# Patient Record
Sex: FEMALE | Race: WHITE | NOT HISPANIC OR LATINO | Employment: OTHER | URBAN - METROPOLITAN AREA
[De-identification: names, ages, dates, MRNs, and addresses within clinical notes are randomized per-mention and may not be internally consistent; named-entity substitution may affect disease eponyms.]

---

## 2017-10-05 ENCOUNTER — HOSPITAL ENCOUNTER (OUTPATIENT)
Facility: MEDICAL CENTER | Age: 67
End: 2017-10-05
Attending: PHYSICIAN ASSISTANT
Payer: MEDICARE

## 2017-10-05 PROCEDURE — 87086 URINE CULTURE/COLONY COUNT: CPT

## 2017-10-07 LAB
BACTERIA UR CULT: NORMAL
SIGNIFICANT IND 70042: NORMAL
SITE SITE: NORMAL
SOURCE SOURCE: NORMAL

## 2019-08-25 PROBLEM — M79.671 RIGHT FOOT PAIN: Status: ACTIVE | Noted: 2019-08-25

## 2019-08-25 PROBLEM — E03.9 HYPOTHYROIDISM: Status: ACTIVE | Noted: 2019-08-25

## 2019-08-25 PROBLEM — I10 ESSENTIAL HYPERTENSION: Status: ACTIVE | Noted: 2019-08-25

## 2019-08-25 PROBLEM — Z86.73 HISTORY OF STROKE: Status: ACTIVE | Noted: 2019-08-25

## 2019-08-25 PROBLEM — Z98.890 HISTORY OF LUMBAR SURGERY: Status: ACTIVE | Noted: 2019-08-25

## 2019-08-25 PROBLEM — M48.061 SPINAL STENOSIS OF LUMBAR REGION WITHOUT NEUROGENIC CLAUDICATION: Status: ACTIVE | Noted: 2019-08-25

## 2023-10-10 PROBLEM — M75.41 IMPINGEMENT SYNDROME OF RIGHT SHOULDER: Status: ACTIVE | Noted: 2023-10-10

## 2023-10-10 PROBLEM — S46.011A STRAIN OF TENDON OF RIGHT ROTATOR CUFF: Status: ACTIVE | Noted: 2023-10-10

## 2023-12-05 ENCOUNTER — APPOINTMENT (OUTPATIENT)
Dept: ADMISSIONS | Facility: MEDICAL CENTER | Age: 73
End: 2023-12-05
Attending: ORTHOPAEDIC SURGERY
Payer: MEDICARE

## 2023-12-12 ENCOUNTER — PRE-ADMISSION TESTING (OUTPATIENT)
Dept: ADMISSIONS | Facility: MEDICAL CENTER | Age: 73
End: 2023-12-12
Attending: ORTHOPAEDIC SURGERY
Payer: MEDICARE

## 2023-12-12 VITALS — BODY MASS INDEX: 24.03 KG/M2 | HEIGHT: 64 IN

## 2023-12-12 RX ORDER — ROSUVASTATIN CALCIUM 5 MG/1
5 TABLET, COATED ORAL DAILY
COMMUNITY

## 2023-12-12 RX ORDER — ACETAMINOPHEN 500 MG
500-1000 TABLET ORAL EVERY 6 HOURS PRN
COMMUNITY

## 2023-12-12 RX ORDER — EVENING PRIMROSE OIL 500 MG
CAPSULE ORAL DAILY
COMMUNITY

## 2023-12-12 RX ORDER — MULTIVIT WITH MINERALS/LUTEIN
2000 TABLET ORAL DAILY
COMMUNITY

## 2023-12-12 NOTE — PREPROCEDURE INSTRUCTIONS
PreAdmit Telephone Appointment: Reviewed the Preparing for your procedure handout with patient over the phone. Patient instructed per pharmacy guidelines regarding taking, holding or contacting provider for instructions on regularly prescribed medications before surgery. Instructed to take the following medications the day of surgery with a sip of water per pharmacy medication guidelines:    Synthroid, tylenol  Confirmed with patient where to check in morning of surgery. Handouts/Brochure/Video emailed to patient.

## 2023-12-28 ENCOUNTER — PRE-ADMISSION TESTING (OUTPATIENT)
Dept: ADMISSIONS | Facility: MEDICAL CENTER | Age: 73
End: 2023-12-28
Attending: ORTHOPAEDIC SURGERY
Payer: MEDICARE

## 2023-12-28 DIAGNOSIS — Z01.810 PRE-OPERATIVE CARDIOVASCULAR EXAMINATION: ICD-10-CM

## 2023-12-28 DIAGNOSIS — Z01.812 PRE-OPERATIVE LABORATORY EXAMINATION: ICD-10-CM

## 2023-12-28 LAB
ANION GAP SERPL CALC-SCNC: 13 MMOL/L (ref 7–16)
BUN SERPL-MCNC: 26 MG/DL (ref 8–22)
CALCIUM SERPL-MCNC: 9.4 MG/DL (ref 8.4–10.2)
CHLORIDE SERPL-SCNC: 104 MMOL/L (ref 96–112)
CO2 SERPL-SCNC: 25 MMOL/L (ref 20–33)
CREAT SERPL-MCNC: 0.72 MG/DL (ref 0.5–1.4)
EKG IMPRESSION: NORMAL
GFR SERPLBLD CREATININE-BSD FMLA CKD-EPI: 88 ML/MIN/1.73 M 2
GLUCOSE SERPL-MCNC: 92 MG/DL (ref 65–99)
POTASSIUM SERPL-SCNC: 3.3 MMOL/L (ref 3.6–5.5)
SODIUM SERPL-SCNC: 142 MMOL/L (ref 135–145)

## 2023-12-28 PROCEDURE — 93005 ELECTROCARDIOGRAM TRACING: CPT

## 2023-12-28 PROCEDURE — 36415 COLL VENOUS BLD VENIPUNCTURE: CPT

## 2023-12-28 PROCEDURE — 93010 ELECTROCARDIOGRAM REPORT: CPT | Performed by: STUDENT IN AN ORGANIZED HEALTH CARE EDUCATION/TRAINING PROGRAM

## 2023-12-28 PROCEDURE — 80048 BASIC METABOLIC PNL TOTAL CA: CPT

## 2023-12-28 RX ORDER — PANTOPRAZOLE SODIUM 40 MG/1
40 TABLET, DELAYED RELEASE ORAL DAILY
COMMUNITY
Start: 2023-12-15

## 2024-01-09 ENCOUNTER — ANESTHESIA EVENT (OUTPATIENT)
Dept: SURGERY | Facility: MEDICAL CENTER | Age: 74
End: 2024-01-09
Payer: MEDICARE

## 2024-01-10 ENCOUNTER — HOSPITAL ENCOUNTER (OUTPATIENT)
Facility: MEDICAL CENTER | Age: 74
End: 2024-01-10
Attending: ORTHOPAEDIC SURGERY | Admitting: ORTHOPAEDIC SURGERY
Payer: MEDICARE

## 2024-01-10 ENCOUNTER — PHARMACY VISIT (OUTPATIENT)
Dept: PHARMACY | Facility: MEDICAL CENTER | Age: 74
End: 2024-01-10
Payer: COMMERCIAL

## 2024-01-10 ENCOUNTER — ANESTHESIA (OUTPATIENT)
Dept: SURGERY | Facility: MEDICAL CENTER | Age: 74
End: 2024-01-10
Payer: MEDICARE

## 2024-01-10 VITALS
RESPIRATION RATE: 18 BRPM | HEIGHT: 64 IN | HEART RATE: 92 BPM | BODY MASS INDEX: 25.2 KG/M2 | SYSTOLIC BLOOD PRESSURE: 159 MMHG | OXYGEN SATURATION: 95 % | DIASTOLIC BLOOD PRESSURE: 94 MMHG | TEMPERATURE: 97.9 F | WEIGHT: 147.6 LBS

## 2024-01-10 DIAGNOSIS — M75.41 IMPINGEMENT SYNDROME OF RIGHT SHOULDER: ICD-10-CM

## 2024-01-10 DIAGNOSIS — S46.011A STRAIN OF TENDON OF RIGHT ROTATOR CUFF, INITIAL ENCOUNTER: ICD-10-CM

## 2024-01-10 PROCEDURE — 29823 SHO ARTHRS SRG XTNSV DBRDMT: CPT | Mod: RT | Performed by: ORTHOPAEDIC SURGERY

## 2024-01-10 PROCEDURE — 160029 HCHG SURGERY MINUTES - 1ST 30 MINS LEVEL 4: Performed by: ORTHOPAEDIC SURGERY

## 2024-01-10 PROCEDURE — 160046 HCHG PACU - 1ST 60 MINS PHASE II: Performed by: ORTHOPAEDIC SURGERY

## 2024-01-10 PROCEDURE — 160035 HCHG PACU - 1ST 60 MINS PHASE I: Performed by: ORTHOPAEDIC SURGERY

## 2024-01-10 PROCEDURE — C1713 ANCHOR/SCREW BN/BN,TIS/BN: HCPCS | Performed by: ORTHOPAEDIC SURGERY

## 2024-01-10 PROCEDURE — 160036 HCHG PACU - EA ADDL 30 MINS PHASE I: Performed by: ORTHOPAEDIC SURGERY

## 2024-01-10 PROCEDURE — 700111 HCHG RX REV CODE 636 W/ 250 OVERRIDE (IP): Performed by: ANESTHESIOLOGY

## 2024-01-10 PROCEDURE — 160048 HCHG OR STATISTICAL LEVEL 1-5: Performed by: ORTHOPAEDIC SURGERY

## 2024-01-10 PROCEDURE — 160002 HCHG RECOVERY MINUTES (STAT): Performed by: ORTHOPAEDIC SURGERY

## 2024-01-10 PROCEDURE — RXMED WILLOW AMBULATORY MEDICATION CHARGE: Performed by: ORTHOPAEDIC SURGERY

## 2024-01-10 PROCEDURE — 29827 SHO ARTHRS SRG RT8TR CUF RPR: CPT | Mod: RT | Performed by: ORTHOPAEDIC SURGERY

## 2024-01-10 PROCEDURE — 700105 HCHG RX REV CODE 258: Performed by: ORTHOPAEDIC SURGERY

## 2024-01-10 PROCEDURE — 29827 SHO ARTHRS SRG RT8TR CUF RPR: CPT | Mod: ASROC,RT | Performed by: PHYSICIAN ASSISTANT

## 2024-01-10 PROCEDURE — 160025 RECOVERY II MINUTES (STATS): Performed by: ORTHOPAEDIC SURGERY

## 2024-01-10 PROCEDURE — 700111 HCHG RX REV CODE 636 W/ 250 OVERRIDE (IP): Mod: JZ | Performed by: ORTHOPAEDIC SURGERY

## 2024-01-10 PROCEDURE — 700102 HCHG RX REV CODE 250 W/ 637 OVERRIDE(OP): Performed by: ANESTHESIOLOGY

## 2024-01-10 PROCEDURE — 700101 HCHG RX REV CODE 250: Performed by: ANESTHESIOLOGY

## 2024-01-10 PROCEDURE — A9270 NON-COVERED ITEM OR SERVICE: HCPCS | Performed by: ANESTHESIOLOGY

## 2024-01-10 PROCEDURE — 29826 SHO ARTHRS SRG DECOMPRESSION: CPT | Mod: ASROC,RT | Performed by: PHYSICIAN ASSISTANT

## 2024-01-10 PROCEDURE — 160041 HCHG SURGERY MINUTES - EA ADDL 1 MIN LEVEL 4: Performed by: ORTHOPAEDIC SURGERY

## 2024-01-10 PROCEDURE — 29826 SHO ARTHRS SRG DECOMPRESSION: CPT | Mod: RT | Performed by: ORTHOPAEDIC SURGERY

## 2024-01-10 PROCEDURE — 160009 HCHG ANES TIME/MIN: Performed by: ORTHOPAEDIC SURGERY

## 2024-01-10 PROCEDURE — 160047 HCHG PACU  - EA ADDL 30 MINS PHASE II: Performed by: ORTHOPAEDIC SURGERY

## 2024-01-10 PROCEDURE — 700101 HCHG RX REV CODE 250: Performed by: ORTHOPAEDIC SURGERY

## 2024-01-10 PROCEDURE — 64415 NJX AA&/STRD BRCH PLXS IMG: CPT | Performed by: ORTHOPAEDIC SURGERY

## 2024-01-10 PROCEDURE — 29823 SHO ARTHRS SRG XTNSV DBRDMT: CPT | Mod: ASROC,RT | Performed by: PHYSICIAN ASSISTANT

## 2024-01-10 DEVICE — ANCHOR SUTURE 4.75MM ALPHAVENT PEEK 3 STRANDS WITH #2 XBRAID (1EA): Type: IMPLANTABLE DEVICE | Site: SHOULDER | Status: FUNCTIONAL

## 2024-01-10 RX ORDER — SODIUM CHLORIDE, SODIUM LACTATE, POTASSIUM CHLORIDE, CALCIUM CHLORIDE 600; 310; 30; 20 MG/100ML; MG/100ML; MG/100ML; MG/100ML
INJECTION, SOLUTION INTRAVENOUS CONTINUOUS
Status: ACTIVE | OUTPATIENT
Start: 2024-01-10 | End: 2024-01-10

## 2024-01-10 RX ORDER — ACETAMINOPHEN 500 MG
1000 TABLET ORAL ONCE
Status: COMPLETED | OUTPATIENT
Start: 2024-01-10 | End: 2024-01-10

## 2024-01-10 RX ORDER — PHENYLEPHRINE HCL IN 0.9% NACL 0.5 MG/5ML
SYRINGE (ML) INTRAVENOUS PRN
Status: DISCONTINUED | OUTPATIENT
Start: 2024-01-10 | End: 2024-01-10 | Stop reason: SURG

## 2024-01-10 RX ORDER — SODIUM CHLORIDE, SODIUM LACTATE, POTASSIUM CHLORIDE, CALCIUM CHLORIDE 600; 310; 30; 20 MG/100ML; MG/100ML; MG/100ML; MG/100ML
INJECTION, SOLUTION INTRAVENOUS CONTINUOUS
Status: DISCONTINUED | OUTPATIENT
Start: 2024-01-10 | End: 2024-01-10 | Stop reason: HOSPADM

## 2024-01-10 RX ORDER — BUPIVACAINE HYDROCHLORIDE AND EPINEPHRINE 2.5; 5 MG/ML; UG/ML
INJECTION, SOLUTION EPIDURAL; INFILTRATION; INTRACAUDAL; PERINEURAL
Status: DISCONTINUED | OUTPATIENT
Start: 2024-01-10 | End: 2024-01-10 | Stop reason: HOSPADM

## 2024-01-10 RX ORDER — ONDANSETRON 2 MG/ML
4 INJECTION INTRAMUSCULAR; INTRAVENOUS
Status: COMPLETED | OUTPATIENT
Start: 2024-01-10 | End: 2024-01-10

## 2024-01-10 RX ORDER — ONDANSETRON 4 MG/1
4 TABLET, ORALLY DISINTEGRATING ORAL EVERY 6 HOURS PRN
Qty: 20 TABLET | Refills: 0 | Status: SHIPPED | OUTPATIENT
Start: 2024-01-10

## 2024-01-10 RX ORDER — DIPHENHYDRAMINE HYDROCHLORIDE 50 MG/ML
12.5 INJECTION INTRAMUSCULAR; INTRAVENOUS
Status: COMPLETED | OUTPATIENT
Start: 2024-01-10 | End: 2024-01-10

## 2024-01-10 RX ORDER — CEFAZOLIN SODIUM 1 G/3ML
2 INJECTION, POWDER, FOR SOLUTION INTRAMUSCULAR; INTRAVENOUS ONCE
Status: COMPLETED | OUTPATIENT
Start: 2024-01-10 | End: 2024-01-10

## 2024-01-10 RX ORDER — DEXAMETHASONE SODIUM PHOSPHATE 4 MG/ML
INJECTION, SOLUTION INTRA-ARTICULAR; INTRALESIONAL; INTRAMUSCULAR; INTRAVENOUS; SOFT TISSUE PRN
Status: DISCONTINUED | OUTPATIENT
Start: 2024-01-10 | End: 2024-01-10 | Stop reason: SURG

## 2024-01-10 RX ORDER — MECLIZINE HYDROCHLORIDE 25 MG/1
50 TABLET ORAL ONCE
Status: COMPLETED | OUTPATIENT
Start: 2024-01-10 | End: 2024-01-10

## 2024-01-10 RX ORDER — ROPIVACAINE HYDROCHLORIDE 5 MG/ML
INJECTION, SOLUTION EPIDURAL; INFILTRATION; PERINEURAL PRN
Status: DISCONTINUED | OUTPATIENT
Start: 2024-01-10 | End: 2024-01-10 | Stop reason: SURG

## 2024-01-10 RX ORDER — ONDANSETRON 2 MG/ML
INJECTION INTRAMUSCULAR; INTRAVENOUS PRN
Status: DISCONTINUED | OUTPATIENT
Start: 2024-01-10 | End: 2024-01-10 | Stop reason: SURG

## 2024-01-10 RX ORDER — AMOXICILLIN 250 MG
1 CAPSULE ORAL ONCE
Status: COMPLETED | OUTPATIENT
Start: 2024-01-10 | End: 2024-01-10

## 2024-01-10 RX ORDER — CELECOXIB 200 MG/1
200 CAPSULE ORAL 2 TIMES DAILY PRN
Qty: 30 CAPSULE | Refills: 1 | Status: SHIPPED | OUTPATIENT
Start: 2024-01-10

## 2024-01-10 RX ORDER — ROCURONIUM BROMIDE 10 MG/ML
INJECTION, SOLUTION INTRAVENOUS PRN
Status: DISCONTINUED | OUTPATIENT
Start: 2024-01-10 | End: 2024-01-10 | Stop reason: SURG

## 2024-01-10 RX ORDER — LIDOCAINE HYDROCHLORIDE 20 MG/ML
INJECTION, SOLUTION EPIDURAL; INFILTRATION; INTRACAUDAL; PERINEURAL PRN
Status: DISCONTINUED | OUTPATIENT
Start: 2024-01-10 | End: 2024-01-10 | Stop reason: SURG

## 2024-01-10 RX ORDER — HYDROCODONE BITARTRATE AND ACETAMINOPHEN 5; 325 MG/1; MG/1
1 TABLET ORAL EVERY 8 HOURS PRN
Qty: 15 TABLET | Refills: 0 | Status: SHIPPED | OUTPATIENT
Start: 2024-01-10 | End: 2024-01-15

## 2024-01-10 RX ADMIN — ROCURONIUM BROMIDE 50 MG: 50 INJECTION, SOLUTION INTRAVENOUS at 07:20

## 2024-01-10 RX ADMIN — DIPHENHYDRAMINE HYDROCHLORIDE 12.5 MG: 50 INJECTION, SOLUTION INTRAMUSCULAR; INTRAVENOUS at 09:12

## 2024-01-10 RX ADMIN — SUGAMMADEX 200 MG: 100 INJECTION, SOLUTION INTRAVENOUS at 08:09

## 2024-01-10 RX ADMIN — Medication 200 MCG: at 07:27

## 2024-01-10 RX ADMIN — SODIUM CHLORIDE, POTASSIUM CHLORIDE, SODIUM LACTATE AND CALCIUM CHLORIDE: 600; 310; 30; 20 INJECTION, SOLUTION INTRAVENOUS at 06:24

## 2024-01-10 RX ADMIN — MECLIZINE HYDROCHLORIDE 12.5 MG: 25 TABLET ORAL at 09:32

## 2024-01-10 RX ADMIN — FENTANYL CITRATE 100 MCG: 50 INJECTION, SOLUTION INTRAMUSCULAR; INTRAVENOUS at 07:14

## 2024-01-10 RX ADMIN — CEFAZOLIN 2 G: 1 INJECTION, POWDER, FOR SOLUTION INTRAMUSCULAR; INTRAVENOUS at 07:25

## 2024-01-10 RX ADMIN — SENNOSIDES AND DOCUSATE SODIUM 1 TABLET: 50; 8.6 TABLET ORAL at 10:27

## 2024-01-10 RX ADMIN — ACETAMINOPHEN 1000 MG: 500 TABLET ORAL at 06:27

## 2024-01-10 RX ADMIN — PROPOFOL 150 MG: 10 INJECTION, EMULSION INTRAVENOUS at 07:19

## 2024-01-10 RX ADMIN — DIPHENHYDRAMINE HYDROCHLORIDE 12.5 MG: 50 INJECTION, SOLUTION INTRAMUSCULAR; INTRAVENOUS at 08:57

## 2024-01-10 RX ADMIN — ONDANSETRON 8 MG: 2 INJECTION INTRAMUSCULAR; INTRAVENOUS at 08:02

## 2024-01-10 RX ADMIN — ROPIVACAINE HYDROCHLORIDE 20 ML: 5 INJECTION EPIDURAL; INFILTRATION; PERINEURAL at 06:54

## 2024-01-10 RX ADMIN — ONDANSETRON 4 MG: 2 INJECTION INTRAMUSCULAR; INTRAVENOUS at 08:34

## 2024-01-10 RX ADMIN — LIDOCAINE HYDROCHLORIDE 3 ML: 20 INJECTION, SOLUTION EPIDURAL; INFILTRATION; INTRACAUDAL at 06:52

## 2024-01-10 RX ADMIN — PROPOFOL 150 MCG/KG/MIN: 10 INJECTION, EMULSION INTRAVENOUS at 07:21

## 2024-01-10 RX ADMIN — DEXAMETHASONE SODIUM PHOSPHATE 8 MG: 4 INJECTION INTRA-ARTICULAR; INTRALESIONAL; INTRAMUSCULAR; INTRAVENOUS; SOFT TISSUE at 07:25

## 2024-01-10 ASSESSMENT — PAIN DESCRIPTION - PAIN TYPE
TYPE: SURGICAL PAIN

## 2024-01-10 ASSESSMENT — FIBROSIS 4 INDEX: FIB4 SCORE: 1.54

## 2024-01-10 ASSESSMENT — PAIN SCALES - GENERAL: PAIN_LEVEL: 0

## 2024-01-10 NOTE — DISCHARGE INSTRUCTIONS
ACTIVITY: Rest and take it easy for the first 24 hours.  A responsible adult is recommended to remain with you during that time.  It is normal to feel sleepy.  We encourage you to not do anything that requires balance, judgment or coordination.    MILD FLU-LIKE SYMPTOMS ARE NORMAL. YOU MAY EXPERIENCE GENERALIZED MUSCLE ACHES, THROAT IRRITATION, HEADACHE AND/OR SOME NAUSEA.    FOR 24 HOURS DO NOT:  Drive, operate machinery or run household appliances.  Drink beer or alcoholic beverages.   Make important decisions or sign legal documents.    DIET: To avoid nausea, slowly advance diet as tolerated, avoiding spicy or greasy foods for the first day.  Add more substantial food to your diet according to your physician's instructions.  INCREASE FLUIDS AND FIBER TO AVOID CONSTIPATION.    FOLLOW-UP APPOINTMENT:  A follow-up appointment should be arranged with your doctor; call to schedule.    You should CALL YOUR PHYSICIAN if you develop:  Fever greater than 101 degrees F.  Pain not relieved by medication, or persistent nausea or vomiting.  Excessive bleeding (blood soaking through dressing) or unexpected drainage from the wound.  Extreme redness or swelling around the incision site, drainage of pus or foul smelling drainage.  Inability to urinate or empty your bladder within 8 hours.  Problems with breathing or chest pain.    You should call 911 if you develop problems with breathing or chest pain.  If you are unable to contact your doctor or surgical center, you should go to the nearest emergency room or urgent care center.  Physician's telephone #: [phone]    If any questions arise, call your doctor.  If your doctor is not available, please feel free to call the Surgical Center at [phone].     A registered nurse may call you a few days after your surgery to see how you are doing after your procedure.    MEDICATIONS: Resume taking daily medication.  Take prescribed pain medication with food.  If no medication  "is prescribed, you may take non-aspirin pain medication if needed.  PAIN MEDICATION CAN BE VERY CONSTIPATING.  Take a stool softener or laxative such as senokot, pericolace, or milk of magnesia if needed.    Last pain medication given at N/A.    If your physician has prescribed pain medication that includes Acetaminophen (Tylenol), do not take additional Acetaminophen (Tylenol) while taking the prescribed medication.      Peripheral Nerve Block Discharge Instructions from Same Day Surgery and Inpatient :    What to Expect - Upper Extremity  You may experience numbness and weakness in shoulder, arm, and hand  on the same side as your surgery  This is normal. For some people, this may be an unpleasant sensation. Be very careful with your numb limb  Ask for help when you need it  Shoulder Surgery Side Effects  In addition to numbness and weakness you may experience other symptoms  Other nerves that are close to those nerves injected can also be affected by local anesthesia  You may experience a hoarseness in your voice  Your breathing may feel different  You may also notice drooping of your eyelid, pupil constriction, and decreased sweating, on the side of your surgery  All of these side effects are normal and will resolve when the local anesthetic wears off   Prevent Injury  Protect the limb like a baby  Beware of exposing your limb to extreme heat or cold or trauma  The limb may be injured without you noticing because it is numb  Keep the limb elevated whenever possible  Do not sleep on the limb  Change the position of the limb regularly  Avoid putting pressure on your surgical limb  Pain Control  The initial block on the day of surgery will make your extremity feel \"numb\"  Any consecutive injection including prior to discharge from the hospital will make your extremity feel \"numb\"  You may feel an aching or burning when the local anesthesia starts to wear off  Take pain pills as prescribed by your surgeon  Call your " surgeon or anesthesiologist if you do not have adequate pain control

## 2024-01-10 NOTE — H&P
Surgery Orthopedic History & Physical Note    Date  1/10/2024    Primary Care Physician  Vitaly Patton D.O.    CC  Pre-Op Diagnosis Codes:     * Strain of tendon of right rotator cuff, initial encounter [S46.011A]     * Impingement syndrome of right shoulder [M75.41]    HPI  This is a 73 y.o. female who presented with right shoulder pain and weakness.    Past Medical History:   Diagnosis Date    Anesthesia 1977 and other times avril    1977 The anestesia was Ketamine and I had a bad reaction, hallucinations and throwing up for hours.    Anxiety     Arthritis     Cancer (Former[facility])     Cataract 2018    Removed    Depression     no meds, not diagnosed    Disorder of thyroid maybe 2006    Take Synthroid now    Gynecological disorder 2014    Over active bladder, Chronic UTI’s, DSD    Heart burn 2023    Gastritis found by Dr. Corral, I assume that is similar to heartburn. But I have no symptoms of heartburn.    High cholesterol     Hyperlipidemia     Hypertension Not sure    I take Irbisartan and hydrochlorothiazide for this    PONV (postoperative nausea and vomiting) 1977 to 2022    I have had nausea and vomiting after anestesia manu times, but not the last time in 2023 w Dr Corral    Sleep apnea April 2023    I use a CPAP machine now    Stroke ([facility]) 2013    Pt uncertain when she had stroke, denies any residual weakness    Urinary bladder disorder Apx Last 7 years    Over active bladder, chronic  UTI’s, frozen bladder, and DSD (better now)    Urinary incontinence 2014    After surgery in 2014 my overactive bladder and leaking got worse       Past Surgical History:   Procedure Laterality Date    PELVIC RECONSTRUCTION LAPAROSCOPIC  2014    pelvic prolapse, rectal repair, sling on urethra    FOOT SURGERY Right 2014    ABDOMINAL EXPLORATION      ABDOMINAL HYSTERECTOMY TOTAL      EYE SURGERY      LAMINOTOMY      LUMPECTOMY      OTHER  [id number]    Dilated structure, 5 times       Current Facility-Administered Medications    Medication Dose Route Frequency Provider Last Rate Last Admin    ceFAZolin (Ancef) injection 2 g  2 g Intravenous Once Arden Cox M.D.        lidocaine (Xylocaine) 1 % injection 0.5 mL  0.5 mL Intradermal Once PRN Arden Cox M.D.        lactated ringers infusion   Intravenous Continuous Arden Cox M.D. 10 mL/hr at 01/10/24 0624 New Bag at 01/10/24 0624       Social History     Socioeconomic History    Marital status:      Spouse name: Not on file    Number of children: Not on file    Years of education: Not on file    Highest education level: Master's degree (e.g., MA, MS, Cari, MEd, MSW, CHRIS)   Occupational History    Not on file   Tobacco Use    Smoking status: Never    Smokeless tobacco: Never   Vaping Use    Vaping Use: Never used   Substance and Sexual Activity    Alcohol use: Not Currently    Drug use: Never    Sexual activity: Not on file   Other Topics Concern    Not on file   Social History Narrative    Not on file     Social Determinants of Health     Financial Resource Strain: Low Risk  (6/21/2023)    Overall Financial Resource Strain (CARDIA)     Difficulty of Paying Living Expenses: Not hard at all   Food Insecurity: No Food Insecurity (6/21/2023)    Hunger Vital Sign     Worried About Running Out of Food in the Last Year: Never true     Ran Out of Food in the Last Year: Never true   Transportation Needs: No Transportation Needs (6/21/2023)    PRAPARE - Transportation     Lack of Transportation (Medical): No     Lack of Transportation (Non-Medical): No   Physical Activity: Sufficiently Active (6/21/2023)    Exercise Vital Sign     Days of Exercise per Week: 5 days     Minutes of Exercise per Session: 50 min   Stress: Stress Concern Present (6/21/2023)    [employer] - Occupational Stress Questionnaire     Feeling of Stress : To some extent   Social Connections: Moderately Integrated (6/21/2023)    Social Connection and Isolation Panel [NHANES]      Frequency of Communication with Friends and Family: More than three times a week     Frequency of Social Gatherings with Friends and Family: More than three times a week     Attends Confucianism Services: Never     Active Member of Clubs or Organizations: Yes     Attends Club or Organization Meetings: More than 4 times per year     Marital Status: Living with partner   Intimate Partner Violence: Not on file   Housing Stability: Low Risk  (6/21/2023)    Housing Stability Vital Sign     Unable to Pay for Housing in the Last Year: No     Number of Places Lived in the Last Year: 1     Unstable Housing in the Last Year: No       Family History   Problem Relation Age of Onset    Heart Failure Father     Heart Disease Father     Psychiatric Illness Father         Schizophrenia    Dementia Father         Lewys Body Dementia    Dementia Mother         Vascular dementia    Heart Disease Mother         Congestive Heart Failure       Allergies  Ketamine, Percocet  [oxycodone-acetaminophen], Protonix [kdc:edetic acid+pantoprazole], Tramadol, Vicodin [hydrocodone-acetaminophen], and Bethanechol    Review of Systems  Negative    Physical Exam  Vitals and nursing note reviewed.   HENT:      Head: Normocephalic.      Nose: Nose normal.   Cardiovascular:      Rate and Rhythm: Normal rate.      Pulses: Normal pulses.   Pulmonary:      Effort: Pulmonary effort is normal.   Musculoskeletal:      Comments: Right shoulder weakness with supraspinatus testing.  Positive impingement.         Vital Signs  Blood Pressure : (!) 163/88   Temperature: 36.3 °C (97.3 °F)   Pulse: 77   Respiration: 20   Pulse Oximetry: 92 %           Radiology:  MRI of the right shoulder reveals a small full-thickness tear of the supraspinatus, curved acromion and some minor degenerative changes of the AC joint         Assessment/Plan:  Pre-Op Diagnosis Codes:     * Strain of tendon of right rotator cuff, initial encounter [S46.011A]     * Impingement syndrome of right  shoulder [M75.41]  Procedure(s) with comments:  Right Shoulder arthroscopy with subacromial decompression, extensive debridement,  rotator cuff repair, and surgeries as indicated - 90 mins  Right

## 2024-01-10 NOTE — ANESTHESIA PREPROCEDURE EVALUATION
Case: [id number] Date/Time: 01/10/24 0645    Procedure: Right Shoulder arthroscopy with subacromial decompression, extensive debridement,  rotator cuff repair, and surgeries as indicated (Right) - 90 mins    Diagnosis:       Strain of tendon of right rotator cuff, initial encounter [S46.011A]      Impingement syndrome of right shoulder [M75.41]    Pre-op diagnosis: Strain of tendon of right rotator cuff, initial encounter [S46.011A]    Location: [place] [address] / SURGERY [facility]    Surgeons: Arden Cox M.D.            Relevant Problems   CARDIAC   (positive) Essential hypertension      ENDO   (positive) Hypothyroidism      Other   (positive) Impingement syndrome of right shoulder       Physical Exam    Airway   Mallampati: II  TM distance: >3 FB  Neck ROM: full       Cardiovascular - normal exam  Rhythm: regular  Rate: normal  (-) murmur     Dental - normal exam           Pulmonary - normal exam  Breath sounds clear to auscultation     Abdominal    Neurological - normal exam                   Anesthesia Plan    ASA 3   ASA physical status 3 criteria: CVA or TIA - history (> 3 months)    Plan - general and peripheral nerve block     Peripheral nerve block will be post-op pain control  Airway plan will be ETT          Induction: intravenous    Postoperative Plan: Postoperative administration of opioids is intended.    Pertinent diagnostic labs and testing reviewed    Informed Consent:    Anesthetic plan and risks discussed with patient.    Use of blood products discussed with: patient whom consented to blood products.

## 2024-01-10 NOTE — OR NURSING
1013: Report rec'd. Pt is currently in bathroom.    1050: Stool softener administered. Home care instructions reviewed w/ pt and . Questions/concerns addressed. Meets criteria for discharge.

## 2024-01-10 NOTE — OR NURSING
0816- Patient arrived from OR via gurney.  R shoulder dressing CDI; radial pulse 2+. Cold pack applied to the R shoulder. +Block. Shoulder immobilizer in place.    Stop Bang per protocol.    Sedation/Resp Status: Non responsive to verbal with no OPA in place. Respirations spontaneous and non-labored.    HR 97SR; VSS on 10L 02 via simple mask.  No pain or nausea as evidence by sleeping.     0830- The patient reports no pain. Nausea reported. Will grab meds. The dressing is CDI.    0834- Medicated per MAR for nausea    0844- Called the patients family and gave updates    0845- The patient reports no pain. She stated her nausea is improving. The dressing is CDI.    0857- Medicated per MAR for nausea    0909- Instructed on IS use, demonstrated good efforts to 1000 for 5 breaths.      0912- Medicated per MAR for nausea    0915- No pain reported. The patient reports nausea ans dizziness still. The dressing is CDI.    0916- Stop bang hold complete    0924- Messaged  to notify him that the patient is still nauseous and dizzy despite interventions. VSS on RA. The patient reports meclazine works for her. Asked for an order for this.    0930- Called the patients family back and gave another update.    0932- Medicated per MAR with meclazine. Gave 12.5mg per patient request. She said 50mg was too much.    0935- Gave handoff report to Aurelia MULTANI    0954- Received report back from Aurelia MULTANI    0955- Gave report to Marie MULTANI    1002- The patient was transported to [facility] for DC.

## 2024-01-10 NOTE — ANESTHESIA PROCEDURE NOTES
Airway    Date/Time: 1/10/2024 7:22 AM    Performed by: Billy Mcdaniel M.D.  Authorized by: Billy Mcdaniel M.D.    Location:  OR  Urgency:  Elective  Indications for Airway Management:  Anesthesia      Spontaneous Ventilation: absent    Sedation Level:  Deep  Preoxygenated: Yes    Patient Position:  Sniffing  Mask Difficulty Assessment:  2 - vent by mask + OA or adjuvant +/- NMBA  Final Airway Type:  Endotracheal airway  Final Endotracheal Airway:  ETT  Cuffed: Yes    Technique Used for Successful ETT Placement:  Video laryngoscopy    Insertion Site:  Oral  Blade Type:  Glide  Laryngoscope Blade/Videolaryngoscope Blade Size:  3  ETT Size (mm):  7.0  Measured from:  Teeth  ETT to Teeth (cm):  22  Placement Verified by: auscultation and capnometry    Cormack-Lehane Classification:  Grade I - full view of glottis  Number of Attempts at Approach:  1   SIVI, easy mask with 90mm oral airway. ATET no teeth contact. Soft bite block

## 2024-01-10 NOTE — ANESTHESIA POSTPROCEDURE EVALUATION
Patient: Shy Pina    Procedure Summary       Date: 01/10/24 Room / Location:  OR 03 / SURGERY [facility]    Anesthesia Start: 0713 Anesthesia Stop: 0818    Procedure: Right Shoulder arthroscopy with subacromial decompression, extensive debridement,  rotator cuff repair (Right: Shoulder) Diagnosis:       Strain of tendon of right rotator cuff, initial encounter      Impingement syndrome of right shoulder      (Strain of tendon of right rotator cuff, initial encounter [S46.011A])    Surgeons: Arden Cox M.D. Responsible Provider: Billy Mcdaniel M.D.    Anesthesia Type: general, peripheral nerve block ASA Status: 3            Final Anesthesia Type: general, peripheral nerve block  Last vitals  BP   Blood Pressure : (!) 163/88    Temp   36.3 °C (97.3 °F)    Pulse   77   Resp   20    SpO2   92 %      Anesthesia Post Evaluation    Patient location during evaluation: PACU  Patient participation: complete - patient participated  Level of consciousness: awake and alert  Pain score: 0    Airway patency: patent  Anesthetic complications: no  Cardiovascular status: hemodynamically stable  Respiratory status: acceptable  Hydration status: euvolemic    PONV: none          There were no known notable events for this encounter.     Nurse Pain Score: 0 (NPRS)

## 2024-01-10 NOTE — DISCHARGE INSTR - OTHER INFO
The patient may remove the dressings and shower 2 days after surgery with incisions uncovered. Leave the sutures and steri strips in place. Do not cut them. Do not submerge the incisions for two weeks. Place band aids on small incisions or clean gauze and an ace wrap on larger incisions after showering. Ice and elevate the extremity. Wear shoulder immobilizer except in controlled situations. Follow up appointment should be scheduled for 7-10 days after surgery. Call or text [phone] for questions or problems.

## 2024-01-10 NOTE — LETTER
October 19, 2023    Patient Name: Shy Pina  Surgeon Name: Arden Cox M.D.  Surgery Facility: [facility] ([address] [facility])  Surgery Date: 1/10/2024    The time of your surgery is not final and may change up to and until the day of your surgery. You will be contacted 24-48 hours prior to your surgery date with your check-in and surgery time.    If you will not be at one of the below numbers please call the surgery scheduler at [phone]  Preferred Phone: [phone]    BEFORE YOUR SURGERY   Pre Registration and/or Lab Work must be done within and no earlier than 28 days prior to your surgery date. Your scheduled facility will contact you regarding all required preregistration and/or lab work. If you have not been contacted within 7 days of your scheduled procedure please call [facility] at [phone] for an appointment as soon as possible.    DAY OF YOUR SURGERY  Nothing to eat or drink after midnight     Refrain from smoking any substance after midnight prior to surgery. Smoking may interfere with the anesthetic and frequently produces nausea during the recovery period.    Continue taking all lifesaving medications. Including the morning of your surgery with small sip of water.    Please do NOT take on the day of surgery:  Diuretics: examples- furosemide (Lasix), spironolactone, hydrochlorothiazide  ACE-inhibitors: examples- lisinopril, ramipril, enalapril  “ARBs”: examples- losartan, Olmesartan, valsartan    Please arrive at the hospital/surgery center at the check-in time provided.     An adult will need to bring you and take you home after your surgery.     AFTER YOUR SURGERY  Post op Appointment:   Date: 01/23/2024   Time: 09:30AM   With: Arden Cox MD   Location: [address] [place] [id number]    - Therapy- Your first appointment should be 7-10  day(s) after your surgery. For your convenience we have 4  Physical Therapy locations: [facility], [place], and [facility]. Call our office ASAP to schedule an appointment at [phone] or take the enclosed Therapy Prescription to a facility of your choice.  - Post Surgery - You will need someone to drive you home  - Post Surgery - You will need someone to stay with you for 24 hours  - You must have someone provide transportation post surgery and someone to monitor you for at least 24 hours post-surgery. If you don't have either of these your appointment will be canceled.     TIME OFF WORK  FMLA or Disability forms can be faxed directly to: [phone] or you may drop them off at [facility] [place] [address]. Our office charges a $35.00 fee per form. Forms will be completed within 10 business days of drop off and payment received. For the status of your forms you may contact our disability office directly [phone].    MEDICATION INSTRUCTIONS **Please read section completely**    The following medications should be stopped a minimum of 10 days prior to surgery:  All over the counter, Supplements & Herbal medications    Anorectics: Phentermine (Adipex-P, Lomaira and Suprenza), Phentermine-topiramate (Qsymia), Bupropion-naltrexone (Contrave)    Opiod Partial Agonists/Opioid Antagonists: Buprenorphine (Subocone, Belbuca, Butrans, Probuphine Implant, Sublocade), Naltrexone (ReVia, Vivitrol), Naloxone    Amphetamines: Dextroamphetamine/Amphetamine (Adderall, Mydayis), Methylphenidate Hydrochloride (Concerta, Metadate, Methylin, Ritalin)    The following medications should be stopped 5 days prior to surgery:  Blood Thinners: Any Aspirin, Aspirin products, anti-inflammatories such as ibuprofen and any blood thinners such as Coumadin and Plavix. Please consult your prescribing physician if you are on life saving blood thinners, in regards to when to stop medications prior to surgery.     The following medications should be stopped a minimum of 3  days prior to surgery:  PDE-5 inhibitors: Sildenafil (Viagra), Tadalafil (Cialis), Vardenafil (Levitra), Avanafil (Stendra)    MAO Inhibitors: Rasagiline (Azilect), Selegiline (Eldepryl, Emsam, Selapar), Isocarboxazid (Marplan), Phenelzine (Nardil)    COVID and INFLUENZA NOTICE TO PATIENTS    Currently, the [facility] does not routinely test patients for COVID-19 or Influenza prior to their elective surgery.  However, if patients develop the following symptoms prior to their surgery date, they should voluntarily test for COVID-19 and Influenza and notify the surgical office of their condition and results.  The symptoms warranting testing would be any two of the following:    Fever (Temp above 100.4 F)  Chills  Cough  Shortness of breath or difficulty breathing  Fatigue  Myalgias (muscle or body aches)  Headache  Sore Throat  Congestion or Runny Nose  Nausea or vomiting  Diarrhea  New loss of taste or smell    Having these symptoms prior to surgery can significantly increase your risk of morbidity and mortality under anesthesia, which may compromise your health and require a transfer to a hospital for a higher level of care.  Therefore, it is advisable to notify the surgical team of any illness in order to get information for the appropriate time to delay the surgery to minimize these preventable risks.    Your health and safety are our number one priority at the [facility], and we are thankful that you entrust us with your care.  Please help us ensure the best possible surgical and anesthetic outcome by sharing appropriate health information with our perioperative team and staff.  We look forward to taking great care of you!    Thank you for your time and consideration on this matter.    Benoit Nielsen MD  Medical Director  Anesthesiologist  [facility]

## 2024-01-10 NOTE — ANESTHESIA TIME REPORT
Anesthesia Start and Stop Event Times       Date Time Event    1/10/2024 0639 Ready for Procedure     0713 Anesthesia Start     0818 Anesthesia Stop          Responsible Staff  01/10/24      Name Role Begin End    Billy Mcdaniel M.D. Anesth 0713 0818          Overtime Reason:  no overtime (within assigned shift)    Comments:

## 2024-01-10 NOTE — OR NURSING
0938: Rcvd report from NERISSA Munoz and assumed care. Pt resting comfortably in the gurney, no pain and not feeling nauseated, just dizzy, medication for that just given prior by NERISSA Munoz per MAR. Dressing CDI.   0940: Pt requesting a cracker, states that she is not nauseated.   0945: Tolerated the cracker and sips of water.Now pt is requesting a stool softener prior to leaving, she did not get a chance to pick any up at home and has had problems with constipation after surgeries before. Sent a text to Dr Mcdaniel.  0954:  Report given back to NERISSA Munoz and she reassumed care. Pain remains tolerable, no nausea, awake and alert, tolerating room air.

## 2024-01-10 NOTE — OP REPORT
DATE OF OPERATION:  1/10/2024     SURGEON:  Arden Cox MD    ASSISTANT: RADHA Espinoza     PREOPERATIVE DIAGNOSES:  1.  Right shoulder rotator cuff tear.  2.  Right shoulder subacromial impingement.    POSTOPERATIVE DIAGNOSES:  1.  Right shoulder full-thickness rotator cuff tear of the supraspinatus.  2.  Right shoulder subacromial impingement.  3.  Right shoulder chondromalacia of the glenoid.  4.  Right shoulder SLAP tear.  5.  Right shoulder partial thickness superior fiber subscapularis tear.    PROCEDURE:  1.  Right shoulder arthroscopy with rotator cuff repair.  2.  Right shoulder arthroscopy with subacromial decompression.  3.  Right shoulder arthroscopy with extensive debridement of the glenohumeral   joint.    ANESTHESIOLOGIST:  Billy Mcdaniel MD    ANESTHETIC:  General endotracheal anesthesia along with interscalene block for   postoperative pain control.    INDICATIONS:  The patient has had right shoulder pain for quite some time.  She  has failed nonoperative treatment. The patient elected to proceed with operative   intervention.  The patient was explained the risks, benefits, alternative procedures   and recovery in detail.  All questions were answered, informed consent was   obtained.  Site verification per protocol was done in the preop holding area   and the operating room.  Patient received appropriate preoperative   antibiotics.    OPERATION:  After successful anesthesia, the patient was placed in modified beach   chair position.  All bony prominences were well padded and supported.  The patient's head   and neck were maintained in a neutral position.  The eyes were protected. The shoulder was then examined.  The shoulder had good range of motion, no evidence of instability.  The arm was prepped and draped in the usual sterile fashion.  A posterior portal was established with a knife and blunt trocar into the glenohumeral space.  A diagnostic arthroscopy   revealed minor chondromalacia on the  glenoid.  This was gently debrided with a shaver. The anterior, superior and posterior labrum had some minor   degenerative fraying that was gently debrided from anterior and posterior portals.  The biceps was normal to visual inspection and probing.  The subscapularis had some minor superior fiber fraying that was gently debrided with a shaver probed and felt to be stable. The undersurface of the rotator cuff  had a degenerative tear.  At that point, I lavaged out the joint and moved the trocar into the subacromial space. I then performed a thorough bursectomy and acromioplasty from multiple portals with a shaver and resector, leveling out the acromion from a type from a type 2 to a type 1.  The rotator cuff had a full-thickness supraspinatus tendon tear.  It was about 1.5 cm wide 1 cm of retraction.  I roughened up the footprint leaving the cortical shell and placed [place] alpha vent triple loaded suture anchor.  I also marrow vented.  All the sutures were tied securely.  I probed the repair, put it through range of motion and was happy with the repair.  I lavaged out the joint, suctioned out the fluid, closed the portals with 3-0 Prolene in an interrupted fashion.  Xeroform, 4x4s, Medipore tape and abduction sling were applied.  Patient was transferred to recovery room in stable condition.       ESTIMATED BLOOD LOSS:  Minimal.    COMPLICATIONS:  None.    COMPONENTS USED: [place] alpha vent triple loaded suture anchor    DME: An abduction shoulder sling was provided following the above surgery to keep the patient's arm well supported during the healing phase. A delay in providing this brace would place the patient at risk of reinjury.    Alon Ha PA-C, was medically necessary for the case.  He helped with   instrumentation, retraction, and positioning.  Without his help, the case would   not have been as technically successful.         ____________________________________     SHAHEEN LACY MD

## 2024-01-10 NOTE — OR NURSING
1002: Patient arrived to phase II from PACU 1 via gurney. Report received from Tammy MULTANI. Respirations are spontaneous and unlabored. Dressing is CDI. VSS on RA. RUE; radial pulse is 2+, cap refill less than 3 seconds, warm.    1004: Family at bedside.    1013: report to Phu MULTANI.

## 2024-01-10 NOTE — ANESTHESIA PROCEDURE NOTES
Peripheral Block    Date/Time: 1/10/2024 6:52 AM    Performed by: Billy Mcdaniel M.D.  Authorized by: Billy Mcdaniel M.D.    Patient Location:  Pre-op  Start Time:  1/10/2024 6:52 AM  Reason for Block: at surgeon's request and post-op pain management ONLY    patient identified, IV checked, site marked, risks and benefits discussed, surgical consent, monitors and equipment checked, pre-op evaluation and timeout performed    Patient Position:  Supine  Prep: ChloraPrep    Monitoring:  Heart rate, continuous pulse ox and cardiac monitor  Block Region:  Upper Extremity  Upper Extremity - Block Type:  BRACHIAL PLEXUS block, Interscalene approach    Laterality:  Right  Procedures: ultrasound guided  Image captured, interpreted and electronically stored.  Local Infiltration:  Lidocaine  Strength:  1 %  Dose:  3 ml  Block Type:  Single-shot  Needle Length:  50mm  Needle Gauge:  22 G  Needle Localization:  Ultrasound guidance  Ultrasound picture in chart  Injection Assessment:  Negative aspiration for heme, no paresthesia on injection, incremental injection and local visualized surrounding nerve on ultrasound  Evidence of intravascular injection: No     Good ultrasound anatomy. Easy block placement. No paresthesias. Picture in chart. Motor strength intact in right upper extremity immediately after block

## 2024-01-11 NOTE — OR NURSING
"Pt called and asked if she could take Pepto-Bismol for \"sore stomach\". Pt states she has \"stomach gurgling\" and 3/10 pain, states she has taken Pepto before without problem. Has Zofran as d/c Rx but states she is not nauseated and does not with to take that. Pt advised not to take more than 1 dose of PeptoBismol w/o checking with pharmacist for interactions with other d/c meds. Pt verbalizes understanding.     "

## 2024-04-03 ENCOUNTER — APPOINTMENT (OUTPATIENT)
Dept: BEHAVIORAL HEALTH | Facility: CLINIC | Age: 74
End: 2024-04-03
Payer: MEDICARE

## 2024-04-03 DIAGNOSIS — F41.9 ANXIETY: ICD-10-CM

## 2024-04-03 PROCEDURE — 90791 PSYCH DIAGNOSTIC EVALUATION: CPT | Performed by: COUNSELOR

## 2024-04-03 NOTE — PROGRESS NOTES
[facility]   Initial Assessment    This visit was conducted via Zoom using secure and encrypted videoconferencing technology.  The patient was in her home in the state of [place].  The patient's identity was confirmed and verbal consent was obtained for this virtual visit.      Name: Shy Pina  MRN: [id number]  : 1950  Age: 73 y.o.  Date of assessment: 4/3/2024  PCP: Vitaly Patton D.O.  Persons in attendance: Patient  Total session time: 45 minutes      CHIEF COMPLAINT AND HISTORY OF PRESENTING PROBLEM:  (as stated by Patient):  Shy Pina is a 73 y.o., White female referred for assessment by No ref. provider found.  Primary presenting issue includes: Anxiety, feelings of uncontrollable worry, fidgety, difficulty concentrating, and difficulty with sleep at times. Patient reports that her goals for therapy are to manage her anxiety and learn tools for her overactive bladder.       BEHAVIORAL HEALTH TREATMENT HISTORY  Does patient/parent report a history of prior behavioral health treatment for patient? Yes:    Dates Level of Care Facilty/Provider Diagnosis/Problem Medications   On/off for last 30 years Therapy  Private practice - therapist retired LAUREN  None reported                                                                     History of untreated behavioral health issues identified? No  Does patient/parent report change in appetite or weight loss/gain?  Reports that she gained about 20 lbs during the pandemic   Does patient/parent report physical pain?  Patient reports that she's been having back pain and several UTI's; patient reports it is likely from stress of having to take care of her adult son.               Indicate if pain is acute or chronic, and location:               Pain scale rating:           FAMILY/SOCIAL HISTORY  Current living situation/household members: Son (temporarily)  Does patient/parent report a family history of behavioral health issues,  diagnoses, or treatment?   Family History   Problem Relation Age of Onset    Heart Failure Father     Heart Disease Father     Psychiatric Illness Father         Schizophrenia    Dementia Father         Lewys Body Dementia    Dementia Mother         Vascular dementia    Heart Disease Mother         Congestive Heart Failure          EMPLOYMENT/RESOURCES  Is the patient currently employed? No  Does the patient/parent report adequate financial resources? Yes       HISTORY:  Does patient report current or past enlistment? No               [If yes, complete below items]  Does patient report history of exposure to combat? No      SPIRITUAL/CULTURAL/IDENTITY:  What are the patient's/family's spiritual beliefs or practices? Not any particular Jew; dad was Latter day, celebrates some Latter day holidays,       ABUSE/NEGLECT/TRAUMA SCREENING  Does patient report feeling “unsafe” in his/her home, or afraid of anyone? No  Does patient report any history of physical, sexual, or emotional abuse?  None reported   Is there evidence of neglect by self? No  Is there evidence of neglect by a caregiver? No                                                                                                          SAFETY ASSESSMENT - SELF  Does patient acknowledge current or past symptoms of dangerousness to self? No  Recent change in frequency/specificity/intensity of suicidal thoughts or self-harm behavior? No  Current access to firearms, medications, or other identified means of suicide/self-harm? Yes  If yes, willing to restrict access to means of suicide/self-harm? Yes      Current Suicide Risk: Not applicable  Crisis Safety Plan completed and copy given to patient:  N/A Patient denies recent/current thoughts/plans of SI and/or self-harm.      SAFETY ASSESSMENT - OTHERS  Recent change in frequency/specificity/intensity of thoughts or threats to harm others? No  If Yes:  Current access to firearms/other identified means of harm?    If yes, willing to restrict access to weapons/means of harm?     Current Homicide Risk:  Not applicable  Crisis Safety Plan completed and copy given to patient?  N/A, patient denies recent/current thoughts/plans of harm to others.   Based on information provided during the current assessment, is a mandated “duty to warn” being exercised? No      SUBSTANCE USE/ADDICTION HISTORY  Patient denies use of any substance/addictive behaviors Yes    If No:  Is there a family history of substance use/addiction?  Patient's middle son struggles with meth addiction.  Does patient acknowledge or parent/significant other report use of/dependence on substances? No  Last time patient used alcohol: None reported  Within the past week? No  Last time patient used marijuana: None reported  Within the past month? No  Any other street drugs ever tried even once? No  Any use of prescription medications/pills without a prescription, or for reasons others than originally prescribed?  No  Any other addictive behavior reported (gambling, shopping, sex)? No     Drug History:  Amphetamine:      Cannibis:      Cocaine:      Ecstasy:      Hallucinogen:      Inhalant:       Opiate:      Other:      Sedative:           MENTAL STATUS/OBSERVATIONS              Participation: Active verbal participation, Attentive, Engaged, and Defensive  Grooming: Casual and Neat  Orientation:Alert and Fully Oriented   Behavior: Tense  Eye contact: Good          Mood:Anxious  Affect:Congruent with content  Thought process: Logical  Thought content:  Within normal limits  Speech: Rate within normal limits and Volume within normal limits  Perception: Within normal limits  Memory: No gross evidence of memory deficits  Insight: Adequate   Judgment:  Adequate  Other:               Family/couple interaction observations: Patient reports that she has 3 sons and is close to all of them. Patient reports that she is the guardian for her middle son who struggles with  Schizophrenia and meth addiction and is in danger of losing his housing. Patient reports that she goes above and beyond for her son and often works harder than he does to get him sober. Patient reports that other members of the family have taken steps back and helped out less frequently.       Patient's motivation/readiness for change: Patient is motivated to manage anxiety.    Topics addressed in psychotherapy include: This provider informed the patient their medical records are totally confidential except for the use by other providers involved in their care, or if the patient signs a release, or to report instances of child or elder abuse, or if it is determined they are an immediate risk to harm themselves or others. Initial evaluation, rapport building, psychoeducation, personal and family history.       Care plan completed: Yes  Does patient express agreement with the above plan? Yes     Diagnosis:  Generalized Anxiety Disorder    Referral appointment(s) scheduled? No       ALLA Christian.

## 2024-05-06 ENCOUNTER — APPOINTMENT (OUTPATIENT)
Dept: BEHAVIORAL HEALTH | Facility: CLINIC | Age: 74
End: 2024-05-06
Payer: MEDICARE

## 2024-05-06 DIAGNOSIS — F41.9 ANXIETY DISORDER, UNSPECIFIED TYPE: ICD-10-CM

## 2024-05-06 PROCEDURE — 90791 PSYCH DIAGNOSTIC EVALUATION: CPT | Performed by: MARRIAGE & FAMILY THERAPIST

## 2024-05-06 NOTE — PROGRESS NOTES
[facility]   Therapy Progress Note        Name: Shy Pina  MRN: [id number]  : 1950  Age: 73 y.o.  Date of assessment: 2024  PCP: Vitaly Patton D.O.  Persons in attendance: Patient  Total session time: 53    Pt reports: she wants to manage her anxiety. Pt has much anxiety, on some days, not all days, as she takes on the 'stressful'  caregiver role for her son. Pt equating 'anxiety' and 'stress.'    Pt describes anxiety/stress as: her 'behavioral and emotional responses to 'triggers.'' Pt also struggling with overactive bladder, frequent UTIs, and feels her present poor reaction to stress is es possibly exacerbating these conditions.   Pt has a couple ideas as to what may be etiology of overactive bladder. OAB Sx began: , after her surgery: her surgery 'may have caused the OAB.' She's working on getting these 2 issues under control, better managed, via medical interventions.    Adult son, Jesus, lives with Pt past couple months, Pt is son's legal guardian. Son Dx with schizophrenia, Pt managing son's meds. Son has substance use problems. Son is on SSDI, and other public assistance. Pt/ Son's father, were not  since , ex passed away last year. Pt's ex , Pt's son's x4 brothers have all decided not to be part of Jesus's care. Pt saw her own outpt talk therapist for x30 years.    Pt explained how she wanted a therapist from other than clinician who completed her assessment.    Objective Observations:   Participation:Active verbal participation   Grooming:Casual   Cognition:Alert   Eye Contact:Good   Mood:Anxious   Affect:Flexible   Thought Process:Goal-directed   Speech:Volume within normal limits    Current Risk:   Suicide: low   Homicide: low   Self-Harm: low   Safety Plan Reviewed: no    Evaluation: Pt motivated to affect improvements in the way she manages and responds to anxiety/stress. Pt quite familiar with various courses of talk therapy, as she's  participated in Tx over past 30 years.    Plan: Tentative plan: Pt familiar with ERP Tx. Wants strategies/skill/tools, to manage her exposure and response to anxiety/stress. I asked Pt to list what she's tried in past, what's worked/not worked, for next session.    Diagnosis: F41.9 Unspecified Anxiety Disorder    ADRIAN Cantu

## 2025-04-23 ENCOUNTER — HOSPITAL ENCOUNTER (OUTPATIENT)
Facility: MEDICAL CENTER | Age: 75
End: 2025-04-23
Attending: OBSTETRICS & GYNECOLOGY
Payer: MEDICARE

## 2025-04-23 PROCEDURE — 87186 SC STD MICRODIL/AGAR DIL: CPT

## 2025-04-23 PROCEDURE — 87086 URINE CULTURE/COLONY COUNT: CPT

## 2025-04-23 PROCEDURE — 87077 CULTURE AEROBIC IDENTIFY: CPT

## 2025-04-25 LAB
BACTERIA UR CULT: ABNORMAL
BACTERIA UR CULT: ABNORMAL
SIGNIFICANT IND 70042: ABNORMAL
SITE SITE: ABNORMAL
SOURCE SOURCE: ABNORMAL

## (undated) DEVICE — TOWEL STOP TIMEOUT SAFETY FLAG (40EA/CA)

## (undated) DEVICE — PACK SHOULDER ARTHROSCOPY SM - (2EA/CA)

## (undated) DEVICE — SUCTION INSTRUMENT YANKAUER BULBOUS TIP W/O VENT (50EA/CA)

## (undated) DEVICE — NEEDLE SAFETY 18 GA X 1 1/2 IN (100EA/BX)

## (undated) DEVICE — TUBING CASSETTE CROSSFLOW INTEGRATED (10EA/CA)

## (undated) DEVICE — SHAVER, 5.5 RESECTOR

## (undated) DEVICE — SENSOR OXIMETER ADULT SPO2 RD SET (20EA/BX)

## (undated) DEVICE — CHLORAPREP 26 ML APPLICATOR - ORANGE TINT(25/CA)

## (undated) DEVICE — TUBING DAY USE W/CARTRIDGE (10EA/BX)

## (undated) DEVICE — SPONGE GAUZESTER 4 X 4 4PLY - (128PK/CA)

## (undated) DEVICE — GLOVE BIOGEL INDICATOR SZ 8 SURGICAL PF LTX - (50/BX 4BX/CA)

## (undated) DEVICE — SYRINGE 30 ML LL (56/BX)

## (undated) DEVICE — SUTURE GENERAL

## (undated) DEVICE — GLOVE BIOGEL SZ 8 SURGICAL PF LTX - (50PR/BX 4BX/CA)

## (undated) DEVICE — ABLATOR WAND SERFAS 90-S CRUISE

## (undated) DEVICE — TUBE E-T HI-LO CUFF 7.0MM (10EA/PK)

## (undated) DEVICE — SPIDER SHOULDER HOLDER (12EA/BX)

## (undated) DEVICE — CANISTER SUCTION RIGID RED 1500CC (40EA/CA)

## (undated) DEVICE — HUMID-VENT HEAT AND MOISTURE EXCHANGE- (50/BX)

## (undated) DEVICE — SUTURE 1 PDS-2 PLUS CTX - (24/BX)

## (undated) DEVICE — GOWN SURGICAL X-LARGE ULTRA - FILM-REINFORCED (20/CA)

## (undated) DEVICE — DRAPETIBURON SHOULDER W/POUCH - (5EA/CA)

## (undated) DEVICE — SUTURE 3-0 PROLENE PS-1 (12PK/BX)

## (undated) DEVICE — SODIUM CHL. IRRIGATION 0.9% 3000ML (4EA/CA 65CA/PF)

## (undated) DEVICE — COVER LIGHT HANDLE FLEXIBLE - SOFT (2EA/PK 80PK/CA)